# Patient Record
Sex: FEMALE | Race: WHITE | Employment: UNEMPLOYED | URBAN - METROPOLITAN AREA
[De-identification: names, ages, dates, MRNs, and addresses within clinical notes are randomized per-mention and may not be internally consistent; named-entity substitution may affect disease eponyms.]

---

## 2021-05-01 ENCOUNTER — HOSPITAL ENCOUNTER (EMERGENCY)
Age: 36
Discharge: HOME | End: 2021-05-01
Payer: COMMERCIAL

## 2021-05-01 ENCOUNTER — APPOINTMENT (OUTPATIENT)
Dept: GENERAL RADIOLOGY | Age: 36
End: 2021-05-01
Payer: COMMERCIAL

## 2021-05-01 VITALS
DIASTOLIC BLOOD PRESSURE: 85 MMHG | WEIGHT: 200 LBS | SYSTOLIC BLOOD PRESSURE: 130 MMHG | OXYGEN SATURATION: 97 % | RESPIRATION RATE: 18 BRPM | HEART RATE: 105 BPM | TEMPERATURE: 98.2 F

## 2021-05-01 DIAGNOSIS — M25.421 PAIN AND SWELLING OF RIGHT ELBOW: Primary | ICD-10-CM
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
## 2021-05-01 DIAGNOSIS — M25.521 PAIN AND SWELLING OF RIGHT ELBOW: Primary | ICD-10-CM

## 2021-05-01 DIAGNOSIS — M79.631 RIGHT FOREARM PAIN: ICD-10-CM

## 2021-05-01 PROCEDURE — 99284 EMERGENCY DEPT VISIT MOD MDM: CPT

## 2021-05-01 PROCEDURE — 73090 X-RAY EXAM OF FOREARM: CPT

## 2021-05-01 PROCEDURE — 29105 APPLICATION LONG ARM SPLINT: CPT

## 2021-05-01 PROCEDURE — 6370000000 HC RX 637 (ALT 250 FOR IP): Performed by: NURSE PRACTITIONER

## 2021-05-01 RX ORDER — HYDROCODONE BITARTRATE AND ACETAMINOPHEN 5; 325 MG/1; MG/1
1-2 TABLET ORAL EVERY 6 HOURS PRN
Qty: 20 TABLET | Refills: 0 | Status: SHIPPED | OUTPATIENT
Start: 2021-05-01 | End: 2021-05-04

## 2021-05-01 RX ORDER — IBUPROFEN 800 MG/1
800 TABLET ORAL ONCE
Status: COMPLETED | OUTPATIENT
Start: 2021-05-01 | End: 2021-05-01

## 2021-05-01 RX ADMIN — IBUPROFEN 800 MG: 800 TABLET, FILM COATED ORAL at 19:10

## 2021-05-01 ASSESSMENT — PAIN DESCRIPTION - PAIN TYPE: TYPE: ACUTE PAIN

## 2021-05-01 ASSESSMENT — PAIN DESCRIPTION - LOCATION: LOCATION: ARM

## 2021-05-01 NOTE — ED PROVIDER NOTES
Evaluated by Advanced Practice Provider    [facility]  ED    CHIEF COMPLAINT  Arm Injury (states was on scooter at the [employer] with kids and fell, braced self with R arm, R arm injury, denies LOC or hitting head )    HISTORY OF PRESENT ILLNESS  Flex Koch is a 38 y.o. female who presents to the ED with complaints of right elbow, arm and wrist pain. Patient was on a scooter at the [employer] with her kids and fell, she fell with most of her weight landing on her right arm, she tried to brace herself with her right arm in a [address] type of fall. Patient denies any other injury. She does report some numbness and tingling into the fingers of the right hand. She is having difficulty moving the right elbow and some pain down into the right wrist.  Denies significant pain into the shoulder but does have some tenderness to the proximal aspect of the upper arm. 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 The patient is currently rating their pain as 7/10 and describes it as an aching type of pain. Treatments tried prior to arrival in the ED include: None. The patient denies other complaints, modifying factors or associated symptoms. The patient arrived to the ED via private car. Nursing notes reviewed. Past Medical History:   Diagnosis Date    Depression     Diabetes mellitus (HCC)     Hyperlipidemia     PCOS (polycystic ovarian syndrome)      Past Surgical History:   Procedure Laterality Date     SECTION      x3     History reviewed. No pertinent family history.   Social History     Socioeconomic History    Marital status:      Spouse name: Not on file    Number of children: Not on file    Years of education: Not on file    Highest education level: Not on file   Occupational History    Not on file   Social Needs    Financial resource strain: Not on file    Food insecurity     Worry: Not on file     Inability: Not on file   [address] Transportation needs     Medical: Not on file     Non-medical: Not on file   Tobacco Use    Smoking status: Former Smoker   Substance and Sexual Activity    Alcohol use: No    Drug use: No    Sexual activity: Not on file   Lifestyle    Physical activity     Days per week: Not on file     Minutes per session: Not on file    Stress: Not on file   Relationships    Social connections     Talks on phone: Not on file     Gets together: Not on file     Attends [employer] service: Not on file     Active member of club or organization: Not on file     Attends meetings of clubs or organizations: Not on file     Relationship status: Not on file    Intimate partner violence     Fear of current or ex partner: Not on file     Emotionally abused: Not on file     Physically abused: Not on file     Forced sexual activity: Not on file   Other Topics Concern    Not on file   Social History Narrative    Not on file     Current Facility-Administered Medications   Medication Dose Route Frequency Provider Last Rate Last Admin    ibuprofen (ADVIL;MOTRIN) tablet 800 mg  800 mg Oral Once Fernando Sam APRN - CNP         Current Outpatient Medications   Medication Sig Dispense Refill    HYDROcodone-acetaminophen (NORCO) 5-325 MG per tablet Take 1-2 tablets by mouth every 6 hours as needed for Pain for up to 3 days.  20 tablet 0    sitaGLIPtin (JANUVIA) 100 MG tablet Take 100 mg by mouth daily      metFORMIN (GLUCOPHAGE) 1000 MG tablet Take 1,000 mg by mouth 2 times daily (with meals)      montelukast (SINGULAIR) 10 MG tablet Take 10 mg by mouth nightly      citalopram (CELEXA) 20 MG tablet Take 20 mg by mouth daily      amitriptyline (ELAVIL) 25 MG tablet Take 25 mg by mouth nightly      mometasone (NASONEX) 50 MCG/ACT nasal spray 2 sprays by Nasal route daily      cetirizine (ZYRTEC) 10 MG tablet Take 10 mg by mouth daily      Prenatal MV-Min-Fe Fum-FA-DHA (PRENATAL 1 PO) Take 1 tablet by mouth daily      omeprazole (PRILOSEC) 20 MG capsule Take 20 mg by mouth daily      insulin glargine (LANTUS) 100 UNIT/ML injection vial Inject 18 Units into the skin nightly       Allergies   Allergen Reactions    Latex     Adhesive Tape     Cephalosporins     Penicillins      REVIEW OF SYSTEMS    10 systems reviewed, pertinent positives per HPI otherwise noted to be negative    PHYSICAL EXAM  /85   Pulse 105   Temp 98.2 °F (36.8 °C) (Oral)   Resp 18   Wt 200 lb (90.7 kg)   LMP 04/21/2021   SpO2 97%   GENERAL APPEARANCE: Well developed, well nourished. Awake and alert. Cooperative. Observed resting in bed. No acute distress. HEAD: Normocephalic. Atraumatic. EYES: Sclera is non-icteric. Conjunctiva normal.  ENT: External ears are normal. Mucous membranes are moist.   NECK: Supple. Normal ROM. Trachea mid-line. Cardiac: Regular rate and rhythm. Capillary refill is brisk in bilateral upper extremities. LUNGS: Breathing is unlabored. Speaking comfortably in full sentences. Equal and symmetric chest rise. Abdomen: Non-distended. EXTREMITIES: Tenderness to palpation of the right elbow with swelling noted along the volar aspect, patient is unable to fully extend the right elbow, she can flex but this does cause pain. Patient is able to pronate the right forearm but cannot supinate the right forearm. There is some mild tenderness to palpation of the right wrist, and no snuffbox tenderness to palpation. Patient does have fully intact range of motion to the fingers and thumb. She is distally neurovascularly intact. Right radial pulse is palpable and cap refill is brisk to the distal fingertips of the right hand. Wrist flexion and extension is limited due to pain. There is some mild tenderness to palpation of the proximal right upper arm and no significant tenderness to palpation of the bony aspects of the right shoulder or clavicle. NEUROLOGICAL: Alert and oriented x3.  Strength is normal. No focal motor or sensory deficits. SKIN: Warm, dry, and intact. Skin is with good color. Psychiatric: Mood and affect appropriate. Speech is clear and articulate. LABS  No results found for this visit on 05/01/21. RADIOLOGY  Xr Radius Ulna Right (2 Views)    Result Date: 5/1/2021  EXAMINATION: TWO XRAY VIEWS OF THE RIGHT FOREARM 5/1/2021 6:20 pm COMPARISON: None. HISTORY: ORDERING SYSTEM PROVIDED HISTORY: fall TECHNOLOGIST PROVIDED HISTORY: Reason for exam:->fall Reason for Exam: Arm Injury (states was on scooter at the [employer] with kids and fell, braced self with R arm, R arm injury, denies LOC or hitting head ) Acuity: Acute Type of Exam: Initial FINDINGS: There is no evidence of acute fracture. There is normal alignment. No acute joint abnormality. No focal osseous lesion. No focal soft tissue abnormality. No acute osseous abnormality. ED COURSE/MDM  Patient seen and evaluated. Old records reviewed. Diagnostic testing reviewed and results discussed. I have evaluated this patient. My supervising physician was available for consultation. Maxcine Skiff presented to the ED today with above noted complaints. Patient appears well, vital signs are normal. There is swelling without bruising to the volar aspect of the right elbow. Patient does have limited range of motion to the right elbow as well as right wrist.  Range of motion is intact to the right digits. She is distally neurovascularly intact to the right upper extremity. X-ray of the right radius and ulna were obtained and without acute findings. They did extend down into the right wrist and up into the right elbow and no abnormalities were seen however based on patient's physical exam with her range of motion limitation I am concerned about the possibility of an occult fracture so I did place her into a long-arm splint.   Patient is also being placed into a sling, she will be discharged with prescription for pain medication and she has been given a referral to orthopedics for further evaluation and management. Under my direction a long-arm splint was placed on the right arm by the ED tech. I have examined the splint thoroughly for functionality. Extremity is distally neurovascularly intact with movement of digits, normal sensation and brisk cap refill. We discussed splint precautions including development of worsening swelling, pain, numbness, tingling, or weakness. Pt was given the following medications or treatments in the ED:   Medications   ibuprofen (ADVIL;MOTRIN) tablet 800 mg (has no administration in time range)       At this point I do not feel the patient requires further work up and it is reasonable to discharge the patient. Please refer to AVS for further details of the discharge instructions. A discussion was had with the patient regarding diagnosis, diagnostic testing results, treatment/ plan of care, and follow up. All questions were answered. Patient will follow up as directed for further evaluation/treatment. The patient was given strict return precautions as we discussed symptoms that would necessitate return to the ED. Patient will return to ED for new/worsening symptoms. The patient verbalized their understanding and agreement with the above plan. I estimate there is LOW risk for COMPARTMENT SYNDROME, DEEP VENOUS THROMBOSIS, SEPTIC ARTHRITIS, TENDON OR NEUROVASCULAR INJURY, thus I consider the discharge disposition reasonable. Zia [id number] and I have discussed the diagnosis and risks, and we agree with discharging home to follow-up with their primary doctor or the referral orthopedist. We also discussed returning to the Emergency Department immediately if new or worsening symptoms occur. We have discussed the symptoms which are most concerning (e.g., changing or worsening pain, numbness, weakness) that necessitate immediate return. Clinial Impression    1. Pain and swelling of right elbow    2.  Fall, initial encounter    3. Right forearm pain        Blood pressure 130/85, pulse 105, temperature 98.2 °F (36.8 °C), temperature source Oral, resp. rate 18, weight 200 lb (90.7 kg), last menstrual period 04/21/2021, SpO2 97 %. Patient was sent home with a prescription for below medication/s. I did [place] patient on appropriate use of these medication. New Prescriptions    HYDROCODONE-ACETAMINOPHEN (NORCO) 5-325 MG PER TABLET    Take 1-2 tablets by mouth every 6 hours as needed for Pain for up to 3 days. FOLLOW UP  Roverto Romero MD  [facility] [address] [facility] [address]  [phone]    Call in 2 days  For further evaluation    MD [id number]  [place] [phone]    Call   As needed    [facility]  ED  [facility] [address]  [phone]  Go to   If symptoms worsen      DISPOSITION  Patient was discharged to home in good condition. Comment: Please note this report has been produced using speech recognition software and may contain errors related to that system including errors in grammar, punctuation, and spelling, as well as words and phrases that may be inappropriate. If there are any questions or concerns please feel free to contact the dictating provider for clarification.         MARIELLA Castillo - CNP  05/01/21 [id number]

## 2021-05-03 ENCOUNTER — TELEPHONE (OUTPATIENT)
Dept: ORTHOPEDIC SURGERY | Age: 36
End: 2021-05-03

## 2021-05-05 ENCOUNTER — OFFICE VISIT (OUTPATIENT)
Dept: ORTHOPEDIC SURGERY | Age: 36
End: 2021-05-05
Payer: COMMERCIAL

## 2021-05-05 VITALS — BODY MASS INDEX: 36.07 KG/M2 | HEIGHT: 62 IN | WEIGHT: 196 LBS

## 2021-05-05 DIAGNOSIS — S53.401A ELBOW SPRAIN, RIGHT, INITIAL ENCOUNTER: ICD-10-CM

## 2021-05-05 DIAGNOSIS — M25.521 RIGHT ELBOW PAIN: Primary | ICD-10-CM

## 2021-05-05 PROCEDURE — 99203 OFFICE O/P NEW LOW 30 MIN: CPT | Performed by: ORTHOPAEDIC SURGERY

## 2021-05-05 RX ORDER — IBUPROFEN 200 MG
200 TABLET ORAL EVERY 6 HOURS PRN
COMMUNITY

## 2021-05-05 NOTE — PROGRESS NOTES
[facility]  Outpatient Progress Note  Summer Gates MD    Patient Name: Jerry Sahni MRN: [id number]   Age: 38 y.o. YOB: 1985   Sex: female      [address] Complaint   Patient presents with    New Patient     Right elbowforearm fall from scooter 5/1/2021 at [facility] landed on out stretched right arm pain forearm numb tinling fingers xr at [employer] splinted/sling       Maverick is a 38 y.o. female referred by Charity Varela CNP for orthopedic consultation regarding a right upper extremity injury. Patient was on a scooter at the [facility] with her kids and fell, she fell with most of her weight landing on her right arm, she tried to brace herself with her right arm in a [address] type of fall. Patient denies any other injury. She does report some numbness and tingling into the fingers of the right hand. She is having difficulty moving the right elbow and some pain down into the right wrist.  Denies significant pain into the shoulder but does have some tenderness to the proximal aspect of the upper arm. 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 She was seen in the emergency department and placed in a long-arm posterior splint and sling.     The patient is currently rating their pain as 7/10 and describes it as an aching type of pain.      Pain Assessment  Location of Pain: Arm  Location Modifiers: Right  Severity of Pain: 7  Quality of Pain: Aching, Sharp  Duration of Pain: Persistent  Frequency of Pain: Constant  Date Pain First Started: 05/01/21  Aggravating Factors: Walking  Limiting Behavior: Yes  Result of Injury: Yes  Work-Related Injury: No  Are there other pain locations you wish to document?: No      PAST MEDICAL HISTORY      Past Medical History:   Diagnosis Date    Depression     Diabetes mellitus (HCC)     Hyperlipidemia     PCOS (polycystic ovarian syndrome)        PAST SURGICAL HISTORY     Past Surgical History:   Procedure Laterality Date     SECTION      x3       MEDICATIONS     Current Outpatient Medications   Medication Sig Dispense Refill    ibuprofen (ADVIL;MOTRIN) 200 MG tablet Take 200 mg by mouth every 6 hours as needed for Pain      sitaGLIPtin (JANUVIA) 100 MG tablet Take 100 mg by mouth daily      metFORMIN (GLUCOPHAGE) 1000 MG tablet Take 1,000 mg by mouth 2 times daily (with meals)      montelukast (SINGULAIR) 10 MG tablet Take 10 mg by mouth nightly      citalopram (CELEXA) 20 MG tablet Take 20 mg by mouth daily      amitriptyline (ELAVIL) 25 MG tablet Take 25 mg by mouth nightly      mometasone (NASONEX) 50 MCG/ACT nasal spray 2 sprays by Nasal route daily      cetirizine (ZYRTEC) 10 MG tablet Take 10 mg by mouth daily      Prenatal MV-Min-Fe Fum-FA-DHA (PRENATAL 1 PO) Take 1 tablet by mouth daily      omeprazole (PRILOSEC) 20 MG capsule Take 20 mg by mouth daily      insulin glargine (LANTUS) 100 UNIT/ML injection vial Inject 18 Units into the skin nightly       No current facility-administered medications for this visit. ALLERGIES     Allergies   Allergen Reactions    Latex     Adhesive Tape     Cephalosporins     Penicillins        FAMILY HISTORY   History reviewed. No pertinent family history.     SOCIAL HISTORY     Social History     Socioeconomic History    Marital status:      Spouse name: None    Number of children: None    Years of education: None    Highest education level: None   Occupational History    None   Social Needs    Financial resource strain: None    Food insecurity     Worry: None     Inability: None    Transportation needs     Medical: None     Non-medical: None   Tobacco Use    Smoking status: Former Smoker    Smokeless tobacco: Never Used   Substance and Sexual Activity    Alcohol use: No    Drug use: No    Sexual activity: None   Lifestyle    Physical activity     Days per week: None Minutes per session: None    Stress: None   Relationships    Social connections     Talks on phone: None     Gets together: None     Attends [employer]al service: None     Active member of club or organization: None     Attends meetings of clubs or organizations: None     Relationship status: None    Intimate partner violence     Fear of current or ex partner: None     Emotionally abused: None     Physically abused: None     Forced sexual activity: None   Other Topics Concern    None   Social History Narrative    None       REVIEW OF SYSTEMS   General: no fever, chills, night sweats, anorexia, malaise, fatigue, or weight change  Hematologic:  no unexplained bleeding or bruising  HEENT:   no nasal congestion, rhinorrhea, sore throat, or facial pain  Respiratory:  no cough, dyspnea, or chest pain  Cardiovascular:  no angina, PERRY, PND, orthopnea, dependent edema, or palpitations  Gastrointestinal:  no nausea, vomiting, diarrhea, constipation, or abdominal pain  Genitourinary:  no urinary urgency, frequency, dysuria, or hematuria  Musculoskeletal: see HPI  Endocrine:  no heat or cold intolerance and no polyphagia, polydipsia, or polyuria  Skin:  no skin eruptions or changing lesions  Neurologic:  no focal weakness, numbness/tingling, tremor, or severe headache. See HPI. See HPI for pertinent positives. PHYSICAL EXAM   Vital Signs: Ht 5' 2\" (1.575 m)   Wt 196 lb (88.9 kg)   LMP 04/21/2021   BMI 35.85 kg/m²     General appearance: healthy, alert, no distress  Skin: Skin color, texture, turgor normal. No rashes or lesions  HEENT: atraumatic, normocephalic. PERRL  Respiratory: Unlabored breathing  Lymphatic: No adenopathy   Neuro: Alert and oriented, normal distal sensation, normal bilateral DTRs  Vascular: Normal distal capillary and distal pulses  Muskuloskeletal Exam:     Right Elbow Examination    Inspection: The carrying angle is normal. and Ecchymosis is found.     Palpation: There is diffuse tenderness to palpation particularly posteriorly    Range of Motion: Range of motion limited by pain. 90 degrees of flexion to 20 degrees of flexion with full supination and pronation however there is pain throughout range of motion    Strength: Biceps rated: 5/5. Triceps rated: 5/5. Supination rated: 5/5. Pronation rated: 5/5. Special Tests: All tests: Valgus Instability, Lateral Pivot Shift, Resisted Wrist, Dorsflexion and Volarflexion, Tinel's are negative. Additional Comments:     RADIOLOGY   X-rays obtained and reviewed in office:  Views 2 views of the right forearm taken in the emergency department and an additional 3 view series of the right elbow    Impression: There is no definite bony abnormality    IMPRESSION     1. Right elbow pain    2. Elbow sprain, right, initial encounter         PLAN   I had a lengthy discussion with patient today regarding diagnosis and treatment options and recommendations. No definite evidence for radial head or neck fracture. Suspect a radiocapitellar sprain with posttraumatic effusion and would recommend immobilization and treatment with ice and oral anti-inflammatory medications anticipating the injury and symptoms to resolve within the next 10 to 14 days. FOLLOWUP     Return in about 2 weeks (around 5/19/2021) for Reevaluation. Orders Placed This Encounter   Procedures    XR ELBOW RIGHT (MIN 3 VIEWS)     X-ray elbow right 3 views     Standing Status:   Future     Number of Occurrences:   1     Standing Expiration Date:   5/5/2022     Order Specific Question:   Reason for exam:     Answer:   RADIAL HEAD VIEW      No orders of the defined types were placed in this encounter.       Patient was instructed on appropriate use of braces, participation in home exercise programs, healthy lifestyle choices and weight loss as appropriate     Tri Gaffney MD

## 2021-05-19 ENCOUNTER — OFFICE VISIT (OUTPATIENT)
Dept: ORTHOPEDIC SURGERY | Age: 36
End: 2021-05-19
Payer: COMMERCIAL

## 2021-05-19 VITALS — WEIGHT: 196 LBS | BODY MASS INDEX: 36.07 KG/M2 | HEIGHT: 62 IN

## 2021-05-19 DIAGNOSIS — S53.401A ELBOW SPRAIN, RIGHT, INITIAL ENCOUNTER: Primary | ICD-10-CM

## 2021-05-19 PROCEDURE — 99213 OFFICE O/P EST LOW 20 MIN: CPT | Performed by: ORTHOPAEDIC SURGERY

## 2021-05-19 NOTE — PROGRESS NOTES
[facility]  Outpatient Progress Note  Nehemiah Rain MD    Patient Name: Sarahi Troncoso MRN: [id number]   Age: 38 y.o. YOB: 1985   Sex: female      [address] Complaint   Patient presents with    Follow-up     2 week f/u sprain right elbow, pain level 3        HISTORY OF PRESENT ILLNESS   Sarahi Troncoso is a 38 y.o. female   The patient presents for a follow up visit:  She reports improvement in pain and range of motion of the right elbow    PAST MEDICAL HISTORY      Past Medical History:   Diagnosis Date    Depression     Diabetes mellitus ([facility] [address]     Hyperlipidemia     PCOS (polycystic ovarian syndrome)        PAST SURGICAL HISTORY     Past Surgical History:   Procedure Laterality Date     SECTION      x3       MEDICATIONS     Current Outpatient Medications   Medication Sig Dispense Refill    ibuprofen (ADVIL;MOTRIN) 200 MG tablet Take 200 mg by mouth every 6 hours as needed for Pain      sitaGLIPtin (JANUVIA) 100 MG tablet Take 100 mg by mouth daily      metFORMIN (GLUCOPHAGE) 1000 MG tablet Take 1,000 mg by mouth 2 times daily (with meals)      montelukast (SINGULAIR) 10 MG tablet Take 10 mg by mouth nightly      citalopram (CELEXA) 20 MG tablet Take 20 mg by mouth daily      amitriptyline (ELAVIL) 25 MG tablet Take 25 mg by mouth nightly      mometasone (NASONEX) 50 MCG/ACT nasal spray 2 sprays by Nasal route daily      cetirizine (ZYRTEC) 10 MG tablet Take 10 mg by mouth daily      Prenatal MV-Min-Fe Fum-FA-DHA (PRENATAL 1 PO) Take 1 tablet by mouth daily      omeprazole (PRILOSEC) 20 MG capsule Take 20 mg by mouth daily      insulin glargine (LANTUS) 100 UNIT/ML injection vial Inject 18 Units into the skin nightly       No current facility-administered medications for this visit.        ALLERGIES     Allergies   Allergen Reactions    Latex     Adhesive Tape     Cephalosporins     Penicillins        FAMILY HISTORY No family history on file. SOCIAL HISTORY     Social History     Socioeconomic History    Marital status:      Spouse name: None    Number of children: None    Years of education: None    Highest education level: None   Occupational History    None   Tobacco Use    Smoking status: Former Smoker    Smokeless tobacco: Never Used   Substance and Sexual Activity    Alcohol use: No    Drug use: No    Sexual activity: None   Other Topics Concern    None   Social History Narrative    None     Social Determinants of Health     Financial Resource Strain:     Difficulty of Paying Living Expenses:    Food Insecurity:     Worried About Running Out of Food in the Last Year:     Ran Out of Food in the Last Year:    Transportation Needs:     Lack of Transportation (Medical):      Lack of Transportation (Non-Medical):    Physical Activity:     Days of Exercise per Week:     Minutes of Exercise per Session:    Stress:     Feeling of Stress :    Social Connections:     Frequency of Communication with Friends and Family:     Frequency of Social Gatherings with Friends and Family:     Attends Anabaptism Services:     Active Member of Clubs or Organizations:     Attends Club or Organization Meetings:     Marital Status:    Intimate Partner Violence:     Fear of Current or Ex-Partner:     Emotionally Abused:     Physically Abused:     Sexually Abused:        REVIEW OF SYSTEMS   General: no fever, chills, night sweats, anorexia, malaise, fatigue, or weight change  Hematologic:  no unexplained bleeding or bruising  HEENT:   no nasal congestion, rhinorrhea, sore throat, or facial pain  Respiratory:  no cough, dyspnea, or chest pain  Cardiovascular:  no angina, PERRY, PND, orthopnea, dependent edema, or palpitations  Gastrointestinal:  no nausea, vomiting, diarrhea, constipation, or abdominal pain  Genitourinary:  no urinary urgency, frequency, dysuria, or hematuria  Musculoskeletal: see HPI  Endocrine: no heat or cold intolerance and no polyphagia, polydipsia, or polyuria  Skin:  no skin eruptions or changing lesions  Neurologic:  no focal weakness, numbness/tingling, tremor, or severe headache. See HPI. See HPI for pertinent positives. PHYSICAL EXAM   Vital Signs: Ht 5' 2\" (1.575 m)   Wt 196 lb (88.9 kg)   LMP 04/21/2021   BMI 35.85 kg/m²     General appearance: healthy, alert, no distress  Skin: Skin color, texture, turgor normal. No rashes or lesions  HEENT: atraumatic, normocephalic. PERRL  Respiratory: Unlabored breathing  Lymphatic: No adenopathy   Neuro: Alert and oriented, normal distal sensation, normal bilateral DTRs  Vascular: Normal distal capillary and distal pulses  Muskuloskeletal Exam:   There is no swelling about the right elbow. There is minimal tenderness to palpation at the radial head/radiocapitellar joint. Range of motion of the elbow is unrestricted. Mild discomfort noted in full flexion and full extension of the elbow    RADIOLOGY   No radiographs taken today    IMPRESSION     1. Elbow sprain, right, initial encounter           PLAN   Discontinue long-arm posterior splint. Encouraged patient to continue range of motion exercises for the elbow. May increase activities as tolerated. FOLLOWUP     Return if symptoms worsen or fail to improve. No orders of the defined types were placed in this encounter. No orders of the defined types were placed in this encounter.       Patient was instructed on appropriate use of braces, participation in home exercise programs, healthy lifestyle choices and weight loss as appropriate     Mack Edge MD

## 2021-05-19 NOTE — PATIENT INSTRUCTIONS
Patient Education        Elbow: Exercises  Introduction  Here are some examples of exercises for you to try. The exercises may be suggested for a condition or for rehabilitation. Start each exercise slowly. Ease off the exercises if you start to have pain. You will be told when to start these exercises and which ones will work best for you. How to do the exercises  Wrist flexor stretch   1. Extend your arm in front of you with your palm up. 2. Bend your wrist, pointing your hand toward the floor. 3. With your other hand, gently bend your wrist farther until you feel a mild to moderate stretch in your forearm. 4. Hold for at least 15 to 30 seconds. Repeat 2 to 4 times. Wrist extensor stretch   1. Repeat steps 1 to 4 of the stretch above but begin with your extended hand palm down. Ball or sock squeeze   1. Hold a tennis ball (or a rolled-up sock) in your hand. 2. Make a fist around the ball (or sock) and squeeze. 3. Hold for about 6 seconds, and then relax for up to 10 seconds. 4. Repeat 8 to 12 times. 5. Switch the ball (or sock) to your other hand and do 8 to 12 times. Wrist deviation   1. Sit so that your arm is supported but your hand hangs off the edge of a flat surface, such as a table. 2. Hold your hand out like you are shaking hands with someone. 3. Move your hand up and down. 4. Repeat this motion 8 to 12 times. 5. Switch arms. 6. Try to do this exercise twice with each hand. Wrist curls   1. Place your forearm on a table with your hand hanging over the edge of the table, palm up. 2. Place a 1- to 2-pound weight in your hand. This may be a dumbbell, a can of food, or a filled water bottle. 3. Slowly raise and lower the weight while keeping your forearm on the table and your palm facing up. 4. Repeat this motion 8 to 12 times. 5. Switch arms, and do steps 1 through 4.  6. Repeat with your hand facing down toward the floor. Switch arms. Biceps curls   1.  Sit leaning forward with your legs slightly spread and your left hand on your left thigh. 2. Place your right elbow on your right thigh, and hold the weight with your forearm horizontal.  3. Slowly curl the weight up and toward your chest.  4. Repeat this motion 8 to 12 times. 5. Switch arms, and do steps 1 through 4. Follow-up care is a key part of your treatment and safety. Be sure to make and go to all appointments, and call your doctor if you are having problems. It's also a good idea to know your test results and keep a list of the medicines you take. Where can you learn more? Go to https://[employer]. org and sign in to your [employer] account. Enter M279 in the [employer] box to learn more about \"Elbow: Exercises. \"     If you do not have an account, please click on the \"Sign Up Now\" link. Current as of: November 16, 2020               Content Version: 12.8  © 2006-2021 [employer]wise[employer]. Care instructions adapted under license by [facility] ([facility]). If you have questions about a medical condition or this instruction, always ask your healthcare professional. [place] [address] any warranty or liability for your use of this information.

## 2021-11-29 ENCOUNTER — TELEPHONE (OUTPATIENT)
Dept: ORTHOPEDIC SURGERY | Age: 36
End: 2021-11-29

## 2021-11-29 NOTE — TELEPHONE ENCOUNTER
Received a signed release for medical records and x-ray cd for  at the [facility] office. Jose x-ray and  has been notified.

## 2025-04-10 ENCOUNTER — OFFICE VISIT (OUTPATIENT)
Age: 40
End: 2025-04-10

## 2025-04-10 VITALS
SYSTOLIC BLOOD PRESSURE: 126 MMHG | BODY MASS INDEX: 37.32 KG/M2 | WEIGHT: 202.8 LBS | TEMPERATURE: 98.6 F | OXYGEN SATURATION: 98 % | DIASTOLIC BLOOD PRESSURE: 70 MMHG | HEART RATE: 78 BPM | HEIGHT: 62 IN

## 2025-04-10 DIAGNOSIS — J06.9 ACUTE URI: Primary | ICD-10-CM

## 2025-04-10 DIAGNOSIS — G43.809 OTHER MIGRAINE WITHOUT STATUS MIGRAINOSUS, NOT INTRACTABLE: ICD-10-CM

## 2025-04-10 RX ORDER — AZELASTINE 1 MG/ML
1 SPRAY, METERED NASAL 2 TIMES DAILY
Qty: 30 ML | Refills: 0 | Status: SHIPPED | OUTPATIENT
Start: 2025-04-10

## 2025-04-10 RX ORDER — SERTRALINE HYDROCHLORIDE 100 MG/1
100 TABLET, FILM COATED ORAL DAILY
COMMUNITY

## 2025-04-10 RX ORDER — ELETRIPTAN HYDROBROMIDE 40 MG/1
40 TABLET, FILM COATED ORAL
COMMUNITY

## 2025-04-10 RX ORDER — TOPIRAMATE 200 MG/1
200 TABLET, FILM COATED ORAL 2 TIMES DAILY
COMMUNITY

## 2025-04-10 RX ORDER — KETOROLAC TROMETHAMINE 30 MG/ML
30 INJECTION, SOLUTION INTRAMUSCULAR; INTRAVENOUS ONCE
Status: COMPLETED | OUTPATIENT
Start: 2025-04-10 | End: 2025-04-10

## 2025-04-10 RX ADMIN — KETOROLAC TROMETHAMINE 30 MG: 30 INJECTION, SOLUTION INTRAMUSCULAR; INTRAVENOUS at 17:54

## 2025-04-10 ASSESSMENT — ENCOUNTER SYMPTOMS
NAUSEA: 1
RHINORRHEA: 1
SORE THROAT: 1
COUGH: 1
PHOTOPHOBIA: 1

## 2025-04-10 NOTE — PATIENT INSTRUCTIONS
severe, or localizes to the lower right side of the abdomen, if dark-tary stools develop, if unresolving vomiting, if blood in noted in stool or vomit, or if you become concerned for dehydration follow up with the emergency room.    Shopping list Claritin or Zyrtec (generic is fine),  Sudafed (the real kind that is stored behind the pharmacy counter and requires a 's license), arm and Hammer simply saline, Vicks vapor rub, Tylenol, ibuprofen    If you develop shortness of breath, increased work of breathing, lightheadedness, or chest pain, contact 911, or follow up immediately with the nearest Emergency Department for further evaluation      Loni,    Thank you for trusting [facility] with your care. Your decision to come to us means a lot and we are honored to be part of your healthcare journey. We value your trust and hope your experience with us was positive and met your expectations.    We're always looking for ways to improve, and your feedback is incredibly important to us. You will receive a text or email soon asking you how your visit went. for If you could take a moment to share your thoughts, it would mean the world to us. Your input helps us better serve you and others in the community.     Thank you again for choosing us. We're grateful for the opportunity to care for you and your loved ones. We hope to see you again - though we always wish you health and wellness!    Warm regards,    [facility]    RT Rupa, Renetta Dhillon, JEN, and Chucho, Clinic Supervisor

## 2025-04-10 NOTE — PROGRESS NOTES
Loni Boyer (: 1985) is a 39 y.o. female, New patient, here for evaluation of the following chief complaint(s):  Sinus Problem (Sinus pressure, headache and bilateral earache started 5 days ago.)        ASSESSMENT/PLAN:    ICD-10-CM    1. Acute URI  J06.9 azelastine (ASTELIN) 0.1 % nasal spray      2. Other migraine without status migrainosus, not intractable  G43.809 ketorolac (TORADOL) injection 30 mg          Acute Upper Respiratory Infection  No point of care tests were completed;  Symptoms include  sinus symptoms, migraine, ear pain, fatigue, body aches, nausea, increased frequency of BM with exam findings of congestion, rhinorrhea, pharyngeal erythema, post nasal drip, there is concern Viral URI  Low concern for bacterial etiology of symptoms given lack of purulent findings and current course of illness less than 10 days, respiratory distress, community acquired pneumonia, PE otitis media, strep pharyngitis, mastoiditis, cellulitis, abscess, facial cellulitis, retained foreign bodies within the ear canal, otic eczema, and ear trauma, Papo's angina, Lemierre syndrome, retropharyngeal syndrome, parapharyngeal infection, retropharyngeal abscess, epiglottitis, uvulitis, peritonsillar abscess, mononucleosis, scarlet fever, and strep rash,  Recommended:  OTC Pseudoephedrine, Flonase, Zyrtec, and Saline nasal spray for congestion relief, advised to avoid with hx HTN or cardiac issues  Acetaminophen (Tylenol) and/or ibuprofen (Motrin, Advil) for aches/pains as needed, provided there are no contraindications  [place] diet, increased fluids, ginger candies for nausea/vomiting relief  Sipping on warm beverages (tea with honey), ice cream, popsicles, sore throat lozenges, Chloraseptic spray, warm salt water gargles for sore throat relief  Prescribed:   Azelastine spray prescribed to help manage nasal congestion and drainage    In clinic ketorolac administered to help with the migraine